# Patient Record
Sex: FEMALE | Race: WHITE | Employment: OTHER | URBAN - METROPOLITAN AREA
[De-identification: names, ages, dates, MRNs, and addresses within clinical notes are randomized per-mention and may not be internally consistent; named-entity substitution may affect disease eponyms.]

---

## 2022-06-03 ENCOUNTER — HOSPITAL ENCOUNTER (OUTPATIENT)
Age: 62
Setting detail: OUTPATIENT SURGERY
Discharge: HOME | End: 2022-06-03
Attending: INTERNAL MEDICINE | Admitting: INTERNAL MEDICINE
Payer: COMMERCIAL

## 2022-06-03 ENCOUNTER — ANESTHESIA (OUTPATIENT)
Dept: ENDOSCOPY | Age: 62
End: 2022-06-03
Payer: COMMERCIAL

## 2022-06-03 ENCOUNTER — ANESTHESIA EVENT (OUTPATIENT)
Dept: ENDOSCOPY | Age: 62
End: 2022-06-03
Payer: COMMERCIAL

## 2022-06-03 VITALS
HEIGHT: 60 IN | WEIGHT: 152 LBS | BODY MASS INDEX: 29.84 KG/M2 | OXYGEN SATURATION: 95 % | TEMPERATURE: 97.2 F | SYSTOLIC BLOOD PRESSURE: 96 MMHG | HEART RATE: 79 BPM | DIASTOLIC BLOOD PRESSURE: 58 MMHG | RESPIRATION RATE: 18 BRPM

## 2022-06-03 DIAGNOSIS — Z12.11 COLON CANCER SCREENING: ICD-10-CM

## 2022-06-03 PROCEDURE — 2580000003 HC RX 258: Performed by: ANESTHESIOLOGY

## 2022-06-03 PROCEDURE — 2709999900 HC NON-CHARGEABLE SUPPLY: Performed by: INTERNAL MEDICINE

## 2022-06-03 PROCEDURE — 2500000003 HC RX 250 WO HCPCS: Performed by: NURSE ANESTHETIST, CERTIFIED REGISTERED

## 2022-06-03 PROCEDURE — 3609010600 HC COLONOSCOPY POLYPECTOMY SNARE/COLD BIOPSY: Performed by: INTERNAL MEDICINE

## 2022-06-03 PROCEDURE — 3700000000 HC ANESTHESIA ATTENDED CARE: Performed by: INTERNAL MEDICINE

## 2022-06-03 PROCEDURE — 7100000011 HC PHASE II RECOVERY - ADDTL 15 MIN: Performed by: INTERNAL MEDICINE

## 2022-06-03 PROCEDURE — 3700000001 HC ADD 15 MINUTES (ANESTHESIA): Performed by: INTERNAL MEDICINE

## 2022-06-03 PROCEDURE — 6360000002 HC RX W HCPCS: Performed by: NURSE ANESTHETIST, CERTIFIED REGISTERED

## 2022-06-03 PROCEDURE — 88305 TISSUE EXAM BY PATHOLOGIST: CPT

## 2022-06-03 PROCEDURE — 7100000010 HC PHASE II RECOVERY - FIRST 15 MIN: Performed by: INTERNAL MEDICINE

## 2022-06-03 RX ORDER — PROPOFOL 10 MG/ML
INJECTION, EMULSION INTRAVENOUS PRN
Status: DISCONTINUED | OUTPATIENT
Start: 2022-06-03 | End: 2022-06-03 | Stop reason: SDUPTHER

## 2022-06-03 RX ORDER — PROPOFOL 10 MG/ML
INJECTION, EMULSION INTRAVENOUS CONTINUOUS PRN
Status: DISCONTINUED | OUTPATIENT
Start: 2022-06-03 | End: 2022-06-03 | Stop reason: SDUPTHER

## 2022-06-03 RX ORDER — GLYCOPYRROLATE 0.2 MG/ML
INJECTION INTRAMUSCULAR; INTRAVENOUS PRN
Status: DISCONTINUED | OUTPATIENT
Start: 2022-06-03 | End: 2022-06-03 | Stop reason: SDUPTHER

## 2022-06-03 RX ORDER — AZELASTINE 1 MG/ML
SPRAY, METERED NASAL DAILY PRN
COMMUNITY
Start: 2022-04-19

## 2022-06-03 RX ORDER — FLUTICASONE PROPIONATE 50 MCG
SPRAY, SUSPENSION (ML) NASAL DAILY
COMMUNITY
Start: 2022-05-24

## 2022-06-03 RX ORDER — ROSUVASTATIN CALCIUM 20 MG/1
TABLET, COATED ORAL EVERY EVENING
COMMUNITY
Start: 2022-05-24

## 2022-06-03 RX ORDER — SPIRONOLACTONE 25 MG/1
TABLET ORAL DAILY
COMMUNITY
Start: 2022-05-24

## 2022-06-03 RX ORDER — METHOCARBAMOL 750 MG/1
TABLET, FILM COATED ORAL 2 TIMES DAILY PRN
COMMUNITY
Start: 2022-04-10

## 2022-06-03 RX ORDER — SODIUM CHLORIDE 0.9 % (FLUSH) 0.9 %
5-40 SYRINGE (ML) INJECTION EVERY 12 HOURS SCHEDULED
Status: DISCONTINUED | OUTPATIENT
Start: 2022-06-03 | End: 2022-06-03 | Stop reason: HOSPADM

## 2022-06-03 RX ORDER — MIDAZOLAM HYDROCHLORIDE 1 MG/ML
INJECTION INTRAMUSCULAR; INTRAVENOUS PRN
Status: DISCONTINUED | OUTPATIENT
Start: 2022-06-03 | End: 2022-06-03 | Stop reason: SDUPTHER

## 2022-06-03 RX ORDER — LIDOCAINE HYDROCHLORIDE 10 MG/ML
1 INJECTION, SOLUTION EPIDURAL; INFILTRATION; INTRACAUDAL; PERINEURAL
Status: DISCONTINUED | OUTPATIENT
Start: 2022-06-03 | End: 2022-06-03 | Stop reason: HOSPADM

## 2022-06-03 RX ORDER — POTASSIUM CHLORIDE 750 MG/1
TABLET, FILM COATED, EXTENDED RELEASE ORAL DAILY
COMMUNITY
Start: 2022-05-05

## 2022-06-03 RX ORDER — TORSEMIDE 20 MG/1
TABLET ORAL DAILY
COMMUNITY
Start: 2022-05-05

## 2022-06-03 RX ORDER — RIVAROXABAN 20 MG/1
TABLET, FILM COATED ORAL DAILY
COMMUNITY
Start: 2022-05-25

## 2022-06-03 RX ORDER — BISOPROLOL FUMARATE 5 MG/1
TABLET ORAL EVERY EVENING
COMMUNITY
Start: 2022-05-24

## 2022-06-03 RX ORDER — LEVOCETIRIZINE DIHYDROCHLORIDE 5 MG/1
5 TABLET, FILM COATED ORAL EVERY EVENING
COMMUNITY

## 2022-06-03 RX ORDER — FOLIC ACID 1 MG/1
TABLET ORAL DAILY
COMMUNITY
Start: 2022-05-26

## 2022-06-03 RX ORDER — SODIUM CHLORIDE 9 MG/ML
INJECTION, SOLUTION INTRAVENOUS PRN
Status: DISCONTINUED | OUTPATIENT
Start: 2022-06-03 | End: 2022-06-03 | Stop reason: HOSPADM

## 2022-06-03 RX ORDER — ASPIRIN 81 MG
1 TABLET, DELAYED RELEASE (ENTERIC COATED) ORAL 2 TIMES DAILY
COMMUNITY

## 2022-06-03 RX ORDER — ALLOPURINOL 300 MG/1
TABLET ORAL DAILY
COMMUNITY
Start: 2022-05-26

## 2022-06-03 RX ORDER — ACETAMINOPHEN 500 MG
1000 TABLET ORAL EVERY EVENING
COMMUNITY

## 2022-06-03 RX ORDER — ADALIMUMAB 40MG/0.4ML
KIT SUBCUTANEOUS
COMMUNITY
Start: 2022-06-01

## 2022-06-03 RX ORDER — CYCLOSPORINE 0.5 MG/ML
1 EMULSION OPHTHALMIC 2 TIMES DAILY
COMMUNITY
Start: 2021-06-25

## 2022-06-03 RX ORDER — SODIUM CHLORIDE, SODIUM LACTATE, POTASSIUM CHLORIDE, CALCIUM CHLORIDE 600; 310; 30; 20 MG/100ML; MG/100ML; MG/100ML; MG/100ML
INJECTION, SOLUTION INTRAVENOUS CONTINUOUS
Status: DISCONTINUED | OUTPATIENT
Start: 2022-06-03 | End: 2022-06-03 | Stop reason: HOSPADM

## 2022-06-03 RX ORDER — LIDOCAINE HYDROCHLORIDE 20 MG/ML
INJECTION, SOLUTION INTRAVENOUS PRN
Status: DISCONTINUED | OUTPATIENT
Start: 2022-06-03 | End: 2022-06-03 | Stop reason: SDUPTHER

## 2022-06-03 RX ORDER — OMEPRAZOLE 40 MG/1
CAPSULE, DELAYED RELEASE ORAL EVERY EVENING
COMMUNITY
Start: 2022-04-15

## 2022-06-03 RX ORDER — SODIUM CHLORIDE 0.9 % (FLUSH) 0.9 %
5-40 SYRINGE (ML) INJECTION PRN
Status: DISCONTINUED | OUTPATIENT
Start: 2022-06-03 | End: 2022-06-03 | Stop reason: HOSPADM

## 2022-06-03 RX ORDER — DULOXETIN HYDROCHLORIDE 30 MG/1
CAPSULE, DELAYED RELEASE ORAL DAILY
COMMUNITY
Start: 2022-04-19

## 2022-06-03 RX ADMIN — PROPOFOL 50 MG: 10 INJECTION, EMULSION INTRAVENOUS at 10:41

## 2022-06-03 RX ADMIN — PROPOFOL 140 MCG/KG/MIN: 10 INJECTION, EMULSION INTRAVENOUS at 10:38

## 2022-06-03 RX ADMIN — MIDAZOLAM HYDROCHLORIDE 2 MG: 2 INJECTION, SOLUTION INTRAMUSCULAR; INTRAVENOUS at 10:43

## 2022-06-03 RX ADMIN — LIDOCAINE HYDROCHLORIDE 60 MG: 20 INJECTION, SOLUTION INTRAVENOUS at 10:38

## 2022-06-03 RX ADMIN — PROPOFOL 100 MG: 10 INJECTION, EMULSION INTRAVENOUS at 10:38

## 2022-06-03 RX ADMIN — PROPOFOL 50 MG: 10 INJECTION, EMULSION INTRAVENOUS at 10:44

## 2022-06-03 RX ADMIN — SODIUM CHLORIDE, POTASSIUM CHLORIDE, SODIUM LACTATE AND CALCIUM CHLORIDE: 600; 310; 30; 20 INJECTION, SOLUTION INTRAVENOUS at 10:27

## 2022-06-03 RX ADMIN — GLYCOPYRROLATE 0.2 MG: 0.2 INJECTION INTRAMUSCULAR; INTRAVENOUS at 10:47

## 2022-06-03 ASSESSMENT — PAIN SCALES - GENERAL
PAINLEVEL_OUTOF10: 0

## 2022-06-03 ASSESSMENT — PAIN - FUNCTIONAL ASSESSMENT: PAIN_FUNCTIONAL_ASSESSMENT: 0-10

## 2022-06-03 NOTE — PROGRESS NOTES
Ambulatory Surgery/Procedure Discharge Note    Vitals:    06/03/22 1125   BP: (!) 96/58   Pulse: 79   Resp: 18   Temp:    SpO2: 95%       In: 350 [P.O.:150; I.V.:200]  Out: -     Restroom use offered before discharge. Yes    Pain assessment:  none  Pain Level: 0    Patient is alert and oriented, speech clear, respirations easy and non-labored, no distress noted. Patient reports a slight dry cough that has improved after ice chips. Pt denies pain or nausea, tolerating ice water without issue. Education completed with patient and spouse, both verbalized understanding of instructions and follow-up. Spouse spoke with Dr. Alyson Bautista via telephone following procedure. Patient discharged to home/self care.  Patient discharged via wheel chair by transporter to waiting family/S.O.       6/3/2022 11:29 AM

## 2022-06-03 NOTE — ANESTHESIA POSTPROCEDURE EVALUATION
Department of Anesthesiology  Postprocedure Note    Patient: Timothy Castañeda  MRN: [id number]  YOB: 1960  Date of evaluation: 6/3/2022  Time:  11:38 AM     Procedure Summary     Date: 06/03/22 Room / Location: [facility] [address]    Anesthesia Start: 1034 Anesthesia Stop: 1104    Procedure: COLONOSCOPY POLYPECTOMY SNARE/COLD BIOPSY Diagnosis:       Colon cancer screening      (Colon cancer screening [Z12.11])    Surgeons: Ellen Ortiz MD Responsible Provider: Miguel Nguyen MD    Anesthesia Type: general ASA Status: 3          Anesthesia Type: No value filed. Glory Phase I: Glory Score: 10    Glory Phase II: Glory Score: 10    Last vitals: Reviewed and per EMR flowsheets.        Anesthesia Post Evaluation    Patient location during evaluation: PACU  Patient participation: complete - patient participated  Level of consciousness: awake and alert  Pain score: 0  Airway patency: patent  Nausea & Vomiting: no nausea and no vomiting  Complications: no  Cardiovascular status: hemodynamically stable  Respiratory status: acceptable  Hydration status: euvolemic

## 2022-06-03 NOTE — H&P
[facility] ENDOSCOPY  Outpatient Procedure H&P    Patient: Surekha Smith MRN: [id number]     YOB: 1960  Age: 65 y.o. Sex: female    Unit: [facility] ENDOSCOPY Room/Bed: Endo Pool/NONE Location: [address] [place] [address]     Procedure: Procedure(s):  COLONOSCOPY    Indication: Colon cancer screening [Z12.11]    Referring  Physician:          Nurses past medical history notes reviewed and agreed. Medications reviewed.     Allergies: Levofloxacin, Metoclopramide, Adhesive tape, Ace inhibitors, Barley grass, Lactase-lactobacillus, Mixed ragweed, Oat, Other, Tetanus toxoids, Vitamin d analogs, Wheat extract, Shellfish-derived products, and Strawberry extract     Allergies noted: Yes     Past Medical History:   Past Medical History:   Diagnosis Date    [facility]     Gout     High cholesterol     HTN (hypertension)     Inappropriate sinus tachycardia     Osteoporosis     Rheumatoid arthritis (HCC)        Past Surgical History:   Past Surgical History:   Procedure Laterality Date     SECTION      x2    THORACOTOMY Left     TONSILLECTOMY AND ADENOIDECTOMY         Social History:   Social History     Socioeconomic History    Marital status:      Spouse name: Not on file    Number of children: Not on file    Years of education: Not on file    Highest education level: Not on file   Occupational History    Not on file   Tobacco Use    Smoking status: Never Smoker    Smokeless tobacco: Never Used   Vaping Use    Vaping Use: Never used   Substance and Sexual Activity    Alcohol use: Yes     Comment: rare    Drug use: Never    Sexual activity: Not on file   Other Topics Concern    Not on file   Social History Narrative    Not on file     Social Determinants of Health     Financial Resource Strain:     Difficulty of Paying Living Expenses: Not on file   Food Insecurity:     Worried About Running Out of Food in the Last Year: Not on file    Ricardo of Food in the Last Year: Not on file Transportation Needs:     Lack of Transportation (Medical): Not on file    Lack of Transportation (Non-Medical): Not on file   Physical Activity:     Days of Exercise per Week: Not on file    Minutes of Exercise per Session: Not on file   Stress:     Feeling of Stress : Not on file   Social Connections:     Frequency of Communication with Friends and Family: Not on file    Frequency of Social Gatherings with Friends and Family: Not on file    Attends [employer] Services: Not on file    Active Member of [address] [place] [address] or Organizations: Not on file    Attends Club or Organization Meetings: Not on file    Marital Status: Not on file   Intimate Partner Violence:     Fear of Current or Ex-Partner: Not on file    Emotionally Abused: Not on file    Physically Abused: Not on file    Sexually Abused: Not on file   Housing Stability:     Unable to Pay for Housing in the Last Year: Not on file    Number of [place] in the Last Year: Not on file    Unstable Housing in the Last Year: Not on file       Family History: No family history on file. Home Medications:   Prior to Admission medications    Medication Sig Start Date End Date Taking?  Authorizing Provider   XARELTO 20 MG TABS tablet daily 5/25/22  Yes Historical Provider, MD   bisoprolol (ZEBETA) 5 MG tablet every evening 5/24/22  Yes Historical Provider, MD   DULoxetine (CYMBALTA) 30 MG extended release capsule daily 4/19/22  Yes Historical Provider, MD   torsemide (DEMADEX) 20 MG tablet daily 5/5/22  Yes Historical Provider, MD   spironolactone (ALDACTONE) 25 MG tablet daily 5/24/22  Yes Historical Provider, MD   potassium chloride (KLOR-CON) 10 MEQ extended release tablet daily 5/5/22  Yes Historical Provider, MD   methocarbamol (ROBAXIN) 750 MG tablet 2 times daily as needed 4/10/22  Yes Historical Provider, MD   allopurinol (ZYLOPRIM) 300 MG tablet daily 5/26/22  Yes Historical Provider, MD   folic acid (FOLVITE) 1 MG tablet daily 5/26/22  Yes Historical Provider, MD   methotrexate (RHEUMATREX) 2.5 MG chemo tablet every 7 days 5/24/22  Yes Historical Provider, MD   omeprazole (PRILOSEC) 40 MG delayed release capsule every evening 4/15/22  Yes Historical Provider, MD   cycloSPORINE (RESTASIS) 0.05 % ophthalmic emulsion Inject 1 drop into the eye in the morning and at bedtime 6/25/21  Yes Historical Provider, MD   azelastine (ASTELIN) 0.1 % nasal spray daily as needed 4/19/22  Yes Historical Provider, MD   HUMIRA PEN 40 MG/0.4ML PNKT  6/1/22  Yes Historical Provider, MD   acetaminophen (TYLENOL) 500 MG tablet Take 1,000 mg by mouth every evening   Yes Historical Provider, MD   Denosumab (PROLIA SC) every 6 months  6/3/21  Yes Historical Provider, MD   fluticasone (FLONASE) 50 MCG/ACT nasal spray daily 5/24/22  Yes Historical Provider, MD   levocetirizine (XYZAL) 5 MG tablet Take 5 mg by mouth every evening   Yes Historical Provider, MD   Multiple Vitamins-Minerals (MULTIVITAMIN-MINERALS) TABS tablet Take 1 tablet by mouth 2 times daily   Yes Historical Provider, MD   rosuvastatin (CRESTOR) 20 MG tablet every evening 5/24/22  Yes Historical Provider, MD   Varenicline Tartrate (TYRVAYA NA) by Nasal route in the morning and at bedtime   Yes Historical Provider, MD   Calcium Citrate-Vitamin D 500-500 MG-UNIT CHEW Take by mouth   Yes Historical Provider, MD   MAGNESIUM PO Take 135 mg by mouth daily   Yes Historical Provider, MD       Review of Systems:  Weight Loss: No  Dysphagia: No  Dyspepsia: No  Melena: no  Chest pain: no    Physical Exam:   Vital Signs: /70   Pulse 74   Temp 97.7 °F (36.5 °C) (Temporal)   Resp 16   Ht 5' (1.524 m)   Wt 152 lb (68.9 kg)   SpO2 100%   BMI 29.69 kg/m²   Vital signs reviewed:Yes    HEENT:Normal  Cardiac:Normal  Chest:Normal  Abdomen:Normal  Exts: Normal  Neuro:Normal    Labs:  No results found for any previous visit.         Imaging:  No orders to display       ASA:2    Mallampati Score: II    Sedation planned:MAC    Patient in acceptable condition for procedure:Yes    10:22 AM 6/3/2022    Ellen Ortiz MD      Please note that some or all of this record was generated using voice recognition software. If there are any questions about the content of this document, please contact the author as some errors in transcription may have occurred.

## 2022-06-03 NOTE — ANESTHESIA PRE PROCEDURE
Department of Anesthesiology  Preprocedure Note       Name:  David Taylor   Age:  65 y.o.  :  1960                                          MRN:  [id number]         Date:  6/3/2022      Surgeon: Jaclyn Meraz):  Gina Mayer MD    Procedure: Procedure(s):  COLONOSCOPY    Medications prior to admission:   Prior to Admission medications    Not on File       Current medications:    No current facility-administered medications for this encounter. Allergies: Allergies   Allergen Reactions    Levofloxacin      Other reaction(s): Joint Pain    Metoclopramide Other (See Comments)     Tremors/uncontrollable shaking.  Adhesive Tape Rash    Ace Inhibitors      Other reaction(s): Cough    Barley Grass Other (See Comments)     BLOATING, DIARRHEA    Lactase-Lactobacillus Other (See Comments)     GI SX    Mixed Ragweed     Oat Other (See Comments)     BLOATING, DIARRHEA    Other      Dust  And Mold    Tetanus Toxoids Other (See Comments) and Swelling     Redness/swollen arm from shoulder to elbow. Not cellulitis. Last injection 1981      Vitamin D Analogs      Other reaction(s): Joint Pain  High dose-does take low dose.  Wheat Extract      Other reaction(s): GI Upset  bloating    Shellfish-Derived Products Nausea And Vomiting    Strawberry Extract Rash       Problem List:  There is no problem list on file for this patient. Past Medical History:  No past medical history on file. Past Surgical History:  No past surgical history on file.     Social History:    Social History     Tobacco Use    Smoking status: Not on file    Smokeless tobacco: Not on file   Substance Use Topics    Alcohol use: Not on file                                Counseling given: Not Answered      Vital Signs (Current):   Vitals:    22 0930   BP: 123/70   Pulse: 74   Resp: 16   Temp: 97.7 °F (36.5 °C)   TempSrc: Temporal   SpO2: 100%   Weight: 152 lb (68.9 kg)   Height: 5' (1.524 m) BP Readings from Last 3 Encounters:   06/03/22 123/70       NPO Status:                                                                                 BMI:   Wt Readings from Last 3 Encounters:   06/03/22 152 lb (68.9 kg)     Body mass index is 29.69 kg/m². CBC: No results found for: WBC, RBC, HGB, HCT, MCV, RDW, PLT    CMP: No results found for: NA, K, CL, CO2, BUN, CREATININE, GFRAA, AGRATIO, LABGLOM, GLUCOSE, GLU, PROT, CALCIUM, BILITOT, ALKPHOS, AST, ALT    POC Tests: No results for input(s): POCGLU, POCNA, POCK, POCCL, POCBUN, POCHEMO, POCHCT in the last 72 hours. Coags: No results found for: PROTIME, INR, APTT    HCG (If Applicable): No results found for: PREGTESTUR, PREGSERUM, HCG, HCGQUANT     ABGs: No results found for: PHART, PO2ART, EYZ7FGN, QNZ3EQW, BEART, F0FXDUIV     Type & Screen (If Applicable):  No results found for: LABABO, LABRH    Drug/Infectious Status (If Applicable):  No results found for: HIV, HEPCAB    COVID-19 Screening (If Applicable): No results found for: COVID19        Anesthesia Evaluation  Patient summary reviewed and Nursing notes reviewed no history of anesthetic complications:   Airway: Mallampati: II  TM distance: >3 FB   Neck ROM: full  Mouth opening: > = 3 FB   Dental:          Pulmonary:Negative Pulmonary ROS                              Cardiovascular:    (+) hypertension:, dysrhythmias: atrial fibrillation,                   Neuro/Psych:   Negative Neuro/Psych ROS              GI/Hepatic/Renal:             Endo/Other:                     Abdominal:             Vascular: negative vascular ROS. Other Findings:           Anesthesia Plan      general     ASA 1    (23-year-old female presents for colonoscopy. Plan general anesthesia with ASA standard monitors. Questions answered. Patient agreeable with anesthetic plan.  )  Induction: intravenous. Anesthetic plan and risks discussed with patient. Plan discussed with CRNA.     Attending anesthesiologist reviewed and agrees with Paulina Limon MD   6/3/2022

## 2022-06-03 NOTE — OP NOTE
Colonoscopy Procedure Note    Patient: Yasmin Carolina MRN: [id number]     YOB: 1960  Age: 65 y.o. Sex: female    Unit: [facility] ENDOSCOPY Room/Bed: [facility]/NONE Location: [address] [place], [place] [address]       Colonoscopy with polypectomy (cold snare)    Admitting Physician: Benedict Ha     Primary Care Physician: Ritu Reyes MD      Preoperative Diagnosis: Colon cancer screening [Z12.11]      DATE OF OPERATION: 6/3/2022    OPERATIVE SURGEON: Eric Linares MD      ANESTHESIA: Monitor Anesthesia Care      Procedure Details:    After informed consent was obtained with all risks and benefits of procedure explained and preoperative exam completed, the patient was taken to the endoscopy suite and placed in the left lateral decubitus position. Upon sequential sedation as per above, a digital rectal exam was performed and was normal.  The Olympus videocolonoscope  was inserted in the rectum and carefully advanced to the cecum. Cecum Intubated : yes. The quality of preparation was good. The colonoscope was slowly withdrawn with careful evaluation between folds. Retroflexion in the rectum was performed. Estimated Blood Loss: minimal    Complications:  none    Findings:   hemorrhoids internal, Small in size  polyp(s) #1, 3 mm in size, located in the transverse colon removed by cold snare and retrieved for pathology  #2, 5 mm in size, located in the transverse colon removed by cold snare and retrieved for pathology    Plan: Await pathology results.         Signed By: Eric Linares MD

## (undated) DEVICE — CANNULA SAMP CO2 AD GRN 7FT CO2 AND 7FT O2 TBNG UNIV CONN

## (undated) DEVICE — TRAP POLYP ETRAP

## (undated) DEVICE — SNARE COLD DIAMOND 10MM THIN